# Patient Record
Sex: MALE | URBAN - METROPOLITAN AREA
[De-identification: names, ages, dates, MRNs, and addresses within clinical notes are randomized per-mention and may not be internally consistent; named-entity substitution may affect disease eponyms.]

---

## 2020-04-23 ENCOUNTER — VIRTUAL VISIT (OUTPATIENT)
Dept: FAMILY MEDICINE | Facility: OTHER | Age: 35
End: 2020-04-23

## 2020-04-24 NOTE — PROGRESS NOTES
"Date: 2020 19:51:19  Clinician: Maria A Valdivia  Clinician NPI: [id number]  Patient: Garrett Medrano  Patient : 1985  [facility] [place] [address]  Patient Phone: [phone]  Visit Protocol: URI  Patient Summary:  Garrett is a 34 year old ( : 1985 ) male who initiated a Visit for COVID-19 (Coronavirus) evaluation and screening. When asked the question \"Please sign me up to receive news, health information and promotions. \", Garrett responded \"No\".    Garrett states his symptoms started suddenly 5-6 days ago.   His symptoms consist of a cough.   Symptom details   Cough: Garrett coughs every 5-10 minutes and his cough is more bothersome at night. Phlegm comes into his throat when he coughs. He does not believe his cough is caused by post-nasal drip. The color of the phlegm is yellow, clear, and white.    Garrett denies having ageusia, anosmia, diarrhea, vomiting, nausea, teeth pain, headache, wheezing, enlarged lymph nodes, chills, facial pain or pressure, sore throat, nasal congestion, malaise, ear pain, fever, myalgias, and rhinitis. He also denies double sickening (worsening symptoms after initial improvement), having recent facial or sinus surgery in the past 60 days, and taking antibiotic medication for the symptoms. He is not experiencing dyspnea.   Precipitating events  He has not recently been exposed to someone with influenza. Garrett has not been in close contact with any high risk individuals.   Pertinent COVID-19 (Coronavirus) information  Garrett has not traveled internationally or to the areas where COVID-19 (Coronavirus) is widespread, including cruise ship travel in the last 14 days before the start of his symptoms.   Garrett does not work or volunteer as healthcare worker or a  and does not work or volunteer in a healthcare facility.   He does not live with a healthcare worker.   Garrett has not had a close contact with a " laboratory-confirmed COVID-19 patient within 14 days of symptom onset. He also has not had a close contact with a suspected COVID-19 patient within 14 days of symptom onset.   Pertinent medical history  Garrett does not need a return to work/school note.   Weight: 203 lbs   Garrett does not smoke or use smokeless tobacco.   Additional information as reported by the patient (free text): Live in an apartment building that does have healthcare workers in it.   Weight: 203 lbs    MEDICATIONS: Xanax oral, ALLERGIES: Bactrim  Clinician Response:  Dear Garrett,    We are currently treating this presumptively due to COVID 19 instead of having people come into the clinic to be tested.  It is still possible this is COVID19 and you should be self isolating at home until you are symptoms free for at least 3 days.  Drink plenty of fluids and rest. I have sent in a Prescription for a cough medication that you can try.  Dear Garrett  Your symptoms show that you may have coronavirus (COVID-19). This illness can cause fever, cough and trouble breathing. Many people get a mild case and get better on their own. Some people can get very sick.   Will I be tested for COVID-19?  Because the virus is spreading, we are no longer testing most patients. You may request testing if:   You are very ill. For example, you're on chemotherapy, dialysis or home hospice care. (Contact your specialty clinic or program.)   You live in a nursing home or other long-term care facility. (Talk to your nurse manager or medical director.)   You're a health care worker. (Contact your employee health office.)   How can I protect others?  Without a test, we can't know for sure that you have COVID-19. For safety, it's very important to follow these rules.  First, stay home and away from others (self-isolate) until:   You've had no fever---and no medicine that reduces fever---for 3 full days (72 hours). And...    Your other symptoms have gotten better. For example,  your cough or breathing has improved. And...   At least 7 days have passed since your symptoms started.   During this time:   Don't go to work, school or anywhere else.    Stay away from others in your home. No hugging, kissing or shaking hands.   Don't let anyone visit.   Cover your mouth and nose with a mask, tissue or wash cloth to avoid spreading germs.   Wash your hands and face often. Use soap and water.   How can I take care of myself?   1.Take Tylenol (acetaminophen) for fever or pain. If you have liver or kidney problems, ask your family doctor if it's okay to take Tylenol.        Adults can take either:    650 mg (two 325 mg pills) every 4 to 6 hours, or...   1,000 mg (two 500 mg pills) every 8 hours as needed.    Note: Don't take more than 3,000 mg in one day.   For children, check the Tylenol bottle for the right dose. The dose is based on the child's age or weight.   2.If you have other health problems (like cancer, heart failure, an organ transplant or severe kidney disease): Call your specialty clinic if you don't feel better in the next 2 days.       3.Know when to call 911: If your breathing is so bad that it keeps you from doing normal activities, call 911 or go to the emergency room. Tell them that you've been staying home and may have COVID-19.   Where can I get more information?  To learn more about COVID-19 and how to care for yourself at home, please visit the CDC website at https://www.cdc.gov/coronavirus/2019-ncov/about/steps-when-sick.html.  For more about your care at [facility], please visit [email or web address]    If you develop acute shortness of breath go to the Emergency Room for further evaluation.  Maria A Pitts APRN, CNP     Diagnosis: Tachycardia, unspecified  Diagnosis ICD: R00.0  Prescription: benzonatate 200 mg oral capsule 3 capsule, 0 days supply. Take 1 capsule by mouth 3 times per day as needed. Refills: 0, Refill as needed: no, Allow  substitutions: yes  Pharmacy: [id number] - [phone] - [address] [place] [address]

## 2020-08-02 ENCOUNTER — VIRTUAL VISIT (OUTPATIENT)
Dept: FAMILY MEDICINE | Facility: OTHER | Age: 35
End: 2020-08-02

## 2020-08-03 NOTE — PROGRESS NOTES
"Date: 2020 18:59:17  Clinician: Angel Blancas  Clinician NPI: [id number]  Patient: Garrett Medrano  Patient : 1985  Patient Address: [address] [place] [address]  Patient Phone: [phone]  Visit Protocol: URI  Patient Summary:  Garrett is a 34 year old ( : 1985 ) male who initiated a Visit for COVID-19 (Coronavirus) evaluation and screening. When asked the question \"Please sign me up to receive news, health information and promotions. \", Garrett responded \"No\".    When asked when his symptoms started, Garrett reported that he does not have any symptoms.   He denies having recent facial or sinus surgery in the past 60 days and taking antibiotic medication in the past month.    Pertinent COVID-19 (Coronavirus) information  In the past 14 days, Garrett has not worked in a congregate living setting.   He does not work or volunteer as healthcare worker or a  and does not work or volunteer in a healthcare facility.   Garrett also has not lived in a congregate living setting in the past 14 days. He does not live with a healthcare worker.   Garrett has not had a close contact with a laboratory-confirmed COVID-19 patient within the last 14 days.   Pertinent medical history  Garrett does not need a return to work/school note.   Weight: 205 lbs   Garrett does not smoke or use smokeless tobacco.   Additional information as reported by the patient (free text): I am going to spend time with my step mom who is receiving chemo for Pancreatic cancer and since I was exposed to someone who was in direct contact with someone who tested positive, they need to insure that I am  negative due to her weakened immune system.   Weight: 205 lbs    MEDICATIONS: Xanax oral, ALLERGIES: Bactrim  Clinician Response:  Dear Garrett,   Based on your exposure to COVID-19 (coronavirus), we would like to test you for this virus.  1. Please call [phone] to schedule your visit. Explain that you were " referred by [facility] to have a COVID-19 test. Be ready to share your [facility] visit ID number.  The following will serve as your written order for this COVID Test, ordered by me, for the indication of suspected COVID [Z20.828]: The test will be ordered in [employer] our electronic health record, after you are scheduled. It will show as ordered and authorized by Marco Carmona MD.  Order: COVID-19 (coronavirus) PCR for ASYMPTOMATIC EXPOSURE testing from [facility].  If you know you have had close contact with someone who tested positive, you should be quarantined for 14 days after this exposure. You should stay in quarantine for the14 days even if the covid test is negative, the optimal time to test after exposure is 5-7 days from the exposure  Quarantine means   What should I do?  For safety, it's very important to follow these rules. Do this for 14 days after the date you were last exposed to the virus..  Stay home and away from others. Don't go to school or anywhere else. Generally quarantine means staying home for work but there are some exceptions to this. Please contact your workplace.   No hugging, kissing or shaking hands.  Don't let anyone visit.  Cover your mouth and nose with a mask, tissue or washcloth to avoid spreading germs.  Wash your hands and face often. Use soap and water.  What are the symptoms of COVID-19?  The most common symptoms are cough, fever and trouble breathing. Less common symptoms include headache, body aches, fatigue (feeling very tired), chills, sore throat, stuffy or runny nose, diarrhea (loose poop), loss of taste or smell, belly pain, and nausea or vomiting (feeling sick to your stomach or throwing up).  After 14 days, if you have still don't have symptoms, you likely don't have this virus.  If you develop symptoms, follow these guidelines.  If you're normally healthy: Please start another [facility] visit to report your symptoms. Go to [email or web address].  If you have a serious health problem (like cancer,  heart failure, an organ transplant or kidney disease): Call your specialty clinic. Let them know that you might have COVID-19.  2. When it's time for your COVID test:  Stay at least 6 feet away from others. (If someone will drive you to your test, stay in the backseat, as far away from the  as you can.)  Cover your mouth and nose with a mask, tissue or washcloth.  Go straight to the testing site. Don't make any stops on the way there or back.  Please note  Caregivers in these groups are at risk for severe illness due to COVID-19:  o People 65 years and older  o People who live in a nursing home or long-term care facility  o People with chronic disease (lung, heart, cancer, diabetes, kidney, liver, immunologic)  o People who have a weakened immune system, including those who:  Are in cancer treatment  Take medicine that weakens the immune system, such as corticosteroids  Had a bone marrow or organ transplant  Have an immune deficiency  Have poorly controlled HIV or AIDS  Are obese (body mass index of 40 or higher)  Smoke regularly  Where can I get more information?  [facility] -- About COVID-19: [email or web address]  CDC -- What to Do If You're Sick: www.cdc.gov/coronavirus/2019-ncov/about/steps-when-sick.html  CDC -- Ending Home Isolation: www.cdc.gov/coronavirus/2019-ncov/hcp/disposition-in-home-patients.html  CDC -- Caring for Someone: www.cdc.gov/coronavirus/2019-ncov/if-you-are-sick/care-for-someone.html  [facility] -- Interim Guidance for Hospital Discharge to Home: www.health.[facility].[place]/diseases/coronavirus/hcp/hospdischarge.pdf  [facility] clinical trials (COVID-19 research studies): [email or web address].[email or web address]/n-clinical-trials  Below are the COVID-19 hotlines at the [place] [facility] ([facility]). Interpreters are available.  For health questions: Call 215-919-4348 or 1-857.744.9323 (7 a.m. to 7 p.m.)  For questions about schools and childcare: Call 062-974-8342 or  3-603-507-8065 (7 a.m. to 7 p.m.)    Diagnosis: Worries  Diagnosis ICD: R45.82